# Patient Record
Sex: FEMALE | Race: WHITE | NOT HISPANIC OR LATINO | URBAN - METROPOLITAN AREA
[De-identification: names, ages, dates, MRNs, and addresses within clinical notes are randomized per-mention and may not be internally consistent; named-entity substitution may affect disease eponyms.]

---

## 2020-03-14 ENCOUNTER — EMERGENCY (EMERGENCY)
Facility: HOSPITAL | Age: 30
LOS: 1 days | Discharge: HOME | End: 2020-03-14
Attending: STUDENT IN AN ORGANIZED HEALTH CARE EDUCATION/TRAINING PROGRAM
Payer: COMMERCIAL

## 2020-03-14 ENCOUNTER — EMERGENCY (EMERGENCY)
Facility: HOSPITAL | Age: 30
LOS: 1 days | Discharge: HOME | End: 2020-03-14
Attending: INTERNAL MEDICINE | Admitting: INTERNAL MEDICINE
Payer: COMMERCIAL

## 2020-03-14 VITALS
TEMPERATURE: 100 F | RESPIRATION RATE: 18 BRPM | SYSTOLIC BLOOD PRESSURE: 114 MMHG | DIASTOLIC BLOOD PRESSURE: 70 MMHG | OXYGEN SATURATION: 100 % | HEART RATE: 92 BPM

## 2020-03-14 VITALS
DIASTOLIC BLOOD PRESSURE: 77 MMHG | HEART RATE: 93 BPM | TEMPERATURE: 99 F | OXYGEN SATURATION: 100 % | SYSTOLIC BLOOD PRESSURE: 112 MMHG | RESPIRATION RATE: 16 BRPM

## 2020-03-14 VITALS
WEIGHT: 115.08 LBS | TEMPERATURE: 99 F | SYSTOLIC BLOOD PRESSURE: 121 MMHG | HEIGHT: 67 IN | RESPIRATION RATE: 16 BRPM | DIASTOLIC BLOOD PRESSURE: 83 MMHG | OXYGEN SATURATION: 97 % | HEART RATE: 110 BPM

## 2020-03-14 VITALS
HEART RATE: 114 BPM | SYSTOLIC BLOOD PRESSURE: 116 MMHG | TEMPERATURE: 100 F | WEIGHT: 113.98 LBS | HEIGHT: 67 IN | DIASTOLIC BLOOD PRESSURE: 74 MMHG | RESPIRATION RATE: 18 BRPM

## 2020-03-14 DIAGNOSIS — K08.409 PARTIAL LOSS OF TEETH, UNSPECIFIED CAUSE, UNSPECIFIED CLASS: Chronic | ICD-10-CM

## 2020-03-14 LAB
ALBUMIN SERPL ELPH-MCNC: 3.8 G/DL — SIGNIFICANT CHANGE UP (ref 3.3–5)
ALBUMIN SERPL ELPH-MCNC: 4.4 G/DL — SIGNIFICANT CHANGE UP (ref 3.3–5)
ALP SERPL-CCNC: 56 U/L — SIGNIFICANT CHANGE UP (ref 40–120)
ALP SERPL-CCNC: 62 U/L — SIGNIFICANT CHANGE UP (ref 40–120)
ALT FLD-CCNC: 25 U/L — SIGNIFICANT CHANGE UP (ref 12–78)
ALT FLD-CCNC: 31 U/L — SIGNIFICANT CHANGE UP (ref 10–45)
ANION GAP SERPL CALC-SCNC: 10 MMOL/L — SIGNIFICANT CHANGE UP (ref 5–17)
ANION GAP SERPL CALC-SCNC: 5 MMOL/L — SIGNIFICANT CHANGE UP (ref 5–17)
APPEARANCE UR: CLEAR — SIGNIFICANT CHANGE UP
AST SERPL-CCNC: 20 U/L — SIGNIFICANT CHANGE UP (ref 15–37)
AST SERPL-CCNC: 30 U/L — SIGNIFICANT CHANGE UP (ref 10–40)
BACTERIA # UR AUTO: NEGATIVE — SIGNIFICANT CHANGE UP
BASOPHILS # BLD AUTO: 0.04 K/UL — SIGNIFICANT CHANGE UP (ref 0–0.2)
BASOPHILS NFR BLD AUTO: 0.6 % — SIGNIFICANT CHANGE UP (ref 0–2)
BILIRUB SERPL-MCNC: 0.6 MG/DL — SIGNIFICANT CHANGE UP (ref 0.2–1.2)
BILIRUB SERPL-MCNC: 0.8 MG/DL — SIGNIFICANT CHANGE UP (ref 0.2–1.2)
BILIRUB UR-MCNC: NEGATIVE — SIGNIFICANT CHANGE UP
BUN SERPL-MCNC: 16 MG/DL — SIGNIFICANT CHANGE UP (ref 7–23)
BUN SERPL-MCNC: 19 MG/DL — SIGNIFICANT CHANGE UP (ref 7–23)
CALCIUM SERPL-MCNC: 8.2 MG/DL — LOW (ref 8.5–10.1)
CALCIUM SERPL-MCNC: 9 MG/DL — SIGNIFICANT CHANGE UP (ref 8.4–10.5)
CHLORIDE SERPL-SCNC: 104 MMOL/L — SIGNIFICANT CHANGE UP (ref 96–108)
CHLORIDE SERPL-SCNC: 109 MMOL/L — HIGH (ref 96–108)
CO2 SERPL-SCNC: 25 MMOL/L — SIGNIFICANT CHANGE UP (ref 22–31)
CO2 SERPL-SCNC: 26 MMOL/L — SIGNIFICANT CHANGE UP (ref 22–31)
COLOR SPEC: SIGNIFICANT CHANGE UP
CREAT SERPL-MCNC: 0.76 MG/DL — SIGNIFICANT CHANGE UP (ref 0.5–1.3)
CREAT SERPL-MCNC: 0.81 MG/DL — SIGNIFICANT CHANGE UP (ref 0.5–1.3)
D DIMER BLD IA.RAPID-MCNC: 154 NG/ML DDU — SIGNIFICANT CHANGE UP
DIFF PNL FLD: NEGATIVE — SIGNIFICANT CHANGE UP
EOSINOPHIL # BLD AUTO: 0.03 K/UL — SIGNIFICANT CHANGE UP (ref 0–0.5)
EOSINOPHIL NFR BLD AUTO: 0.5 % — SIGNIFICANT CHANGE UP (ref 0–6)
EPI CELLS # UR: 1 /HPF — SIGNIFICANT CHANGE UP
GLUCOSE SERPL-MCNC: 100 MG/DL — HIGH (ref 70–99)
GLUCOSE SERPL-MCNC: 109 MG/DL — HIGH (ref 70–99)
GLUCOSE UR QL: NEGATIVE — SIGNIFICANT CHANGE UP
HCG SERPL-ACNC: <1 MIU/ML — SIGNIFICANT CHANGE UP
HCT VFR BLD CALC: 32.6 % — LOW (ref 34.5–45)
HCT VFR BLD CALC: 36.9 % — SIGNIFICANT CHANGE UP (ref 34.5–45)
HGB BLD-MCNC: 10.6 G/DL — LOW (ref 11.5–15.5)
HGB BLD-MCNC: 11.3 G/DL — LOW (ref 11.5–15.5)
HYALINE CASTS # UR AUTO: 0 /LPF — SIGNIFICANT CHANGE UP (ref 0–2)
IMM GRANULOCYTES NFR BLD AUTO: 0.3 % — SIGNIFICANT CHANGE UP (ref 0–1.5)
KETONES UR-MCNC: NEGATIVE — SIGNIFICANT CHANGE UP
LEUKOCYTE ESTERASE UR-ACNC: NEGATIVE — SIGNIFICANT CHANGE UP
LYMPHOCYTES # BLD AUTO: 0.6 K/UL — LOW (ref 1–3.3)
LYMPHOCYTES # BLD AUTO: 9.3 % — LOW (ref 13–44)
MAGNESIUM SERPL-MCNC: 2 MG/DL — SIGNIFICANT CHANGE UP (ref 1.6–2.6)
MCHC RBC-ENTMCNC: 26.9 PG — LOW (ref 27–34)
MCHC RBC-ENTMCNC: 28.2 PG — SIGNIFICANT CHANGE UP (ref 27–34)
MCHC RBC-ENTMCNC: 30.6 GM/DL — LOW (ref 32–36)
MCHC RBC-ENTMCNC: 32.5 GM/DL — SIGNIFICANT CHANGE UP (ref 32–36)
MCV RBC AUTO: 86.7 FL — SIGNIFICANT CHANGE UP (ref 80–100)
MCV RBC AUTO: 87.9 FL — SIGNIFICANT CHANGE UP (ref 80–100)
MONOCYTES # BLD AUTO: 0.41 K/UL — SIGNIFICANT CHANGE UP (ref 0–0.9)
MONOCYTES NFR BLD AUTO: 6.4 % — SIGNIFICANT CHANGE UP (ref 2–14)
NEUTROPHILS # BLD AUTO: 5.32 K/UL — SIGNIFICANT CHANGE UP (ref 1.8–7.4)
NEUTROPHILS NFR BLD AUTO: 82.9 % — HIGH (ref 43–77)
NITRITE UR-MCNC: NEGATIVE — SIGNIFICANT CHANGE UP
NRBC # BLD: 0 /100 WBCS — SIGNIFICANT CHANGE UP (ref 0–0)
NRBC # BLD: 0 /100 WBCS — SIGNIFICANT CHANGE UP (ref 0–0)
PH UR: 6.5 — SIGNIFICANT CHANGE UP (ref 5–8)
PHOSPHATE SERPL-MCNC: 3.1 MG/DL — SIGNIFICANT CHANGE UP (ref 2.5–4.5)
PLATELET # BLD AUTO: 166 K/UL — SIGNIFICANT CHANGE UP (ref 150–400)
PLATELET # BLD AUTO: 169 K/UL — SIGNIFICANT CHANGE UP (ref 150–400)
POTASSIUM SERPL-MCNC: 3.9 MMOL/L — SIGNIFICANT CHANGE UP (ref 3.5–5.3)
POTASSIUM SERPL-MCNC: 4.1 MMOL/L — SIGNIFICANT CHANGE UP (ref 3.5–5.3)
POTASSIUM SERPL-SCNC: 3.9 MMOL/L — SIGNIFICANT CHANGE UP (ref 3.5–5.3)
POTASSIUM SERPL-SCNC: 4.1 MMOL/L — SIGNIFICANT CHANGE UP (ref 3.5–5.3)
PROT SERPL-MCNC: 6.5 G/DL — SIGNIFICANT CHANGE UP (ref 6–8.3)
PROT SERPL-MCNC: 7 G/DL — SIGNIFICANT CHANGE UP (ref 6–8.3)
PROT UR-MCNC: NEGATIVE — SIGNIFICANT CHANGE UP
RAPID RVP RESULT: SIGNIFICANT CHANGE UP
RBC # BLD: 3.76 M/UL — LOW (ref 3.8–5.2)
RBC # BLD: 4.2 M/UL — SIGNIFICANT CHANGE UP (ref 3.8–5.2)
RBC # FLD: 14.5 % — SIGNIFICANT CHANGE UP (ref 10.3–14.5)
RBC # FLD: 14.7 % — HIGH (ref 10.3–14.5)
RBC CASTS # UR COMP ASSIST: 1 /HPF — SIGNIFICANT CHANGE UP (ref 0–4)
SODIUM SERPL-SCNC: 139 MMOL/L — SIGNIFICANT CHANGE UP (ref 135–145)
SODIUM SERPL-SCNC: 140 MMOL/L — SIGNIFICANT CHANGE UP (ref 135–145)
SP GR SPEC: 1.01 — SIGNIFICANT CHANGE UP (ref 1.01–1.02)
UROBILINOGEN FLD QL: NEGATIVE — SIGNIFICANT CHANGE UP
WBC # BLD: 6.42 K/UL — SIGNIFICANT CHANGE UP (ref 3.8–10.5)
WBC # BLD: 6.91 K/UL — SIGNIFICANT CHANGE UP (ref 3.8–10.5)
WBC # FLD AUTO: 6.42 K/UL — SIGNIFICANT CHANGE UP (ref 3.8–10.5)
WBC # FLD AUTO: 6.91 K/UL — SIGNIFICANT CHANGE UP (ref 3.8–10.5)
WBC UR QL: 0 /HPF — SIGNIFICANT CHANGE UP (ref 0–5)

## 2020-03-14 PROCEDURE — 83735 ASSAY OF MAGNESIUM: CPT

## 2020-03-14 PROCEDURE — 81001 URINALYSIS AUTO W/SCOPE: CPT

## 2020-03-14 PROCEDURE — 99284 EMERGENCY DEPT VISIT MOD MDM: CPT

## 2020-03-14 PROCEDURE — 87486 CHLMYD PNEUM DNA AMP PROBE: CPT

## 2020-03-14 PROCEDURE — 85027 COMPLETE CBC AUTOMATED: CPT

## 2020-03-14 PROCEDURE — 36415 COLL VENOUS BLD VENIPUNCTURE: CPT

## 2020-03-14 PROCEDURE — 84100 ASSAY OF PHOSPHORUS: CPT

## 2020-03-14 PROCEDURE — 87581 M.PNEUMON DNA AMP PROBE: CPT

## 2020-03-14 PROCEDURE — 96374 THER/PROPH/DIAG INJ IV PUSH: CPT

## 2020-03-14 PROCEDURE — 93010 ELECTROCARDIOGRAM REPORT: CPT

## 2020-03-14 PROCEDURE — 93005 ELECTROCARDIOGRAM TRACING: CPT

## 2020-03-14 PROCEDURE — 87798 DETECT AGENT NOS DNA AMP: CPT

## 2020-03-14 PROCEDURE — 80053 COMPREHEN METABOLIC PANEL: CPT

## 2020-03-14 PROCEDURE — 87633 RESP VIRUS 12-25 TARGETS: CPT

## 2020-03-14 PROCEDURE — 99284 EMERGENCY DEPT VISIT MOD MDM: CPT | Mod: 25

## 2020-03-14 PROCEDURE — 85379 FIBRIN DEGRADATION QUANT: CPT

## 2020-03-14 PROCEDURE — 84702 CHORIONIC GONADOTROPIN TEST: CPT

## 2020-03-14 PROCEDURE — 99285 EMERGENCY DEPT VISIT HI MDM: CPT

## 2020-03-14 RX ORDER — PANTOPRAZOLE SODIUM 20 MG/1
40 TABLET, DELAYED RELEASE ORAL ONCE
Refills: 0 | Status: COMPLETED | OUTPATIENT
Start: 2020-03-14 | End: 2020-03-14

## 2020-03-14 RX ORDER — ACETAMINOPHEN 500 MG
650 TABLET ORAL EVERY 6 HOURS
Refills: 0 | Status: DISCONTINUED | OUTPATIENT
Start: 2020-03-14 | End: 2020-03-18

## 2020-03-14 RX ORDER — DEXLANSOPRAZOLE 30 MG/1
1 CAPSULE, DELAYED RELEASE ORAL
Qty: 0 | Refills: 0 | DISCHARGE

## 2020-03-14 RX ADMIN — Medication 650 MILLIGRAM(S): at 13:34

## 2020-03-14 RX ADMIN — Medication 650 MILLIGRAM(S): at 14:04

## 2020-03-14 RX ADMIN — PANTOPRAZOLE SODIUM 40 MILLIGRAM(S): 20 TABLET, DELAYED RELEASE ORAL at 01:46

## 2020-03-14 NOTE — ED PROVIDER NOTE - PATIENT PORTAL LINK FT
You can access the FollowMyHealth Patient Portal offered by [facility] by registering at the following website: [email or web address]. By joining [facility]’s FollowMyHealth portal, you will also be able to view your health information using other applications (apps) compatible with our system.

## 2020-03-14 NOTE — ED ADULT NURSE NOTE - OBJECTIVE STATEMENT
Patient with history of hyperthyroidism (no meds) and acid reflux (dexilant) presents to the unit with steady gait with c/o intermittent epigastric area "pressure-like" since this morning.  Nothing makes it worse, took Gaviscon to no relief.  Patient also c/o frequent "migraine" x 3 months described as sharp pain above R eye brow, advised by PCP to see a neurologist but patient has not seen one yet.  Also endorses fever, 100.3 today; took motrin around 1900 last night.  Does Patient with history of hyperthyroidism (no meds) and acid reflux (dexilant) presents to the unit with steady gait with c/o intermittent epigastric area "pressure-like" since this morning.  Nothing makes it worse, took Gaviscon to no relief.  Patient also c/o frequent "migraine" x 3 months described as sharp pain above R eye brow, advised by PCP to see a neurologist but patient has not seen one yet.  Also endorses fever, 100.3 today; took motrin around 1900 last night.  Denies SOB, lightheadedness, dizziness, numbness, tingling, sick contacts, recent travel.  Does not take birth control, is not a smoker or drinker.

## 2020-03-14 NOTE — ED ADULT NURSE NOTE - OBJECTIVE STATEMENT
Patient is a 30 yo woman with hx: hyperthyroidism and  GERD who presented to the ER with chest pain and abdominal pain with nausea. Pt was seen in [facility] for the same problem. No distress. Breathing easy and non labored. Pt ambulatory. Pt very anxious. Emotional support offered.

## 2020-03-14 NOTE — ED PROVIDER NOTE - PHYSICAL EXAMINATION
PHYSICAL EXAM:  Vital Signs Last 24 Hrs  T(C): 37.6 (14 Mar 2020 09:27), Max: 37.6 (14 Mar 2020 09:27)  T(F): 99.6 (14 Mar 2020 09:27), Max: 99.6 (14 Mar 2020 09:27)  HR: 114 (14 Mar 2020 09:27) (114 - 114)  BP: 116/74 (14 Mar 2020 09:27) (116/74 - 116/74)  RR: 18 (14 Mar 2020 09:27) (18 - 18)    CONSTITUTIONAL: NAD, thin   EYES: PERRLA; conjunctiva and sclera clear  ENMT: Moist oral mucosa  NECK: Supple  RESPIRATORY: Normal respiratory effort; lungs are clear to auscultation bilaterally  CARDIOVASCULAR: Regular rate and rhythm, normal S1 and S2, no murmur/rub/gallop; No lower extremity edema; Peripheral pulses are 2+ bilaterally  ABDOMEN: Nontender to palpation, normoactive bowel sounds  MUSCULOSKELETAL:  Normal gait  PSYCH: A+O to person, place, and time; affect appropriate  NEUROLOGY: no gross sensory deficits   SKIN: No rashes; no palpable lesions

## 2020-03-14 NOTE — ED POST DISCHARGE NOTE - DETAILS
Pt called asking about results, informed of results and told that out of precaution would recommend self quarantine x 14 days, avoid exposure to large groups and especially to very young/elderly/immunocompromised. Reutnr to ED if SOB or any other concerns. Call GI to discuss policy on office visits for gastritis. -Dallin Sanchez PA-C

## 2020-03-14 NOTE — ED PROVIDER NOTE - PATIENT PORTAL LINK FT
You can access the FollowMyHealth Patient Portal offered by [facility] by registering at the following website: [email or web address]. By joining [facility]’s FollowMyHealth portal, you will also be able to view your health information using other applications (apps) compatible with our system.

## 2020-03-14 NOTE — ED PROVIDER NOTE - CLINICAL SUMMARY MEDICAL DECISION MAKING FREE TEXT BOX
acute chest wall pain that is reproducible and H/O GERD on PPI, the symptoms have subsided  Will order EKG , labs and D-Dimer, will refer to out patient cardiology

## 2020-03-14 NOTE — ED PROVIDER NOTE - CLINICAL SUMMARY MEDICAL DECISION MAKING FREE TEXT BOX
Hien Palm MD 29 F w/ hx of hyperthyroidism, p/w generalized weakness, and "feeling unwell" pt denies any nausea no vomiting. No lightheadedness, no fevers, no chills. Pt was seen at [facility] less than 12 hours ago, was dx w/ gerd, went home but now is having worsening symptoms. Pt states she ate breakfast this AM, but now is requesting hospital admission. She denies any vomiting, no weightloss, feels safe at home, pt reports that she has no SI/HI/anxiety/depression. pt is nontoxic appearing, is ambulatory to the department, lungs clear R flank pain, no suprapubic abdominal pain. plan for repeat electrolyte testing, symptomatic treatment w/ ppi and likely dc home. Pt is currently on menstraul period, ekg sinus tach Hien Palm MD 29 F w/ hx of hyperthyroidism, p/w generalized weakness, and "feeling unwell" pt denies any nausea no vomiting. No lightheadedness, no fevers, no chills. Pt was seen at [facility] less than 12 hours ago, was dx w/ gerd, went home but now is having worsening symptoms. Pt states she ate breakfast this AM, but now is requesting hospital admission. She denies any vomiting, no weightloss, feels safe at home, pt reports that she has no SI/HI/anxiety/depression. pt is nontoxic appearing, is ambulatory to the department, lungs clear R flank pain, no suprapubic abdominal pain. plan for repeat electrolyte testing, symptomatic treatment w/ ppi and likely dc home. Pt is currently on menstraul period, ekg sinus tach, HR was improved.

## 2020-03-14 NOTE — ED PROVIDER NOTE - SIGNIFICANT NEGATIVE FINDINGS
no headache, no syncope,  no SOB, no palpitations, no abdominal pain , no urinary symptoms,  no neuro changes.

## 2020-03-14 NOTE — ED ADULT NURSE NOTE - CHPI ED NUR SYMPTOMS NEG
no dizziness/no diaphoresis/no congestion/no back pain/no shortness of breath/no fever/no nausea/no syncope/no vomiting/no chills

## 2020-03-14 NOTE — ED PROVIDER NOTE - OBJECTIVE STATEMENT
28 y/o female  C/C chest pain, nausea, increased with palpation and inspiration. The pain was gripping before she arrived but she is more relaxed. She has GERD symptoms and takes A PPI, also experiencing  migraine since 0900 yesterday.  no fever no chills, no rash, no toxemia no SOB no sick contacts, no travel

## 2020-03-14 NOTE — ED PROVIDER NOTE - PROGRESS NOTE DETAILS
BA PGY-3 Patient with epigastric pain, concerning for gastritis/reflux 2/2 to NSAID use. Improved with maalox.  Lab wnl. Low grade fever, s/p rvp and acetaminophen. Dispo home, with follow up with PCP. BA PGY-3 Patient with epigastric pain, concerning for gastritis/reflux 2/2 to NSAID use. Lab wnl. Low grade fever, s/p rvp and acetaminophen. Dispo home, with follow up with PCP.

## 2020-03-14 NOTE — ED PROVIDER NOTE - NS ED ROS FT
Constitutional:  Fatigue, Weight loss (-7 pounds)  Eyes: No recent vision problems or eye pain.  ENT: No congestion, ear pain, or sore throat.  Endocrine: No excess sweating, temperature intolerance  Cardiovascular: No chest pain, palpitations, shortness of breath, pre-syncope, syncope  Respiratory: No cough, congestion, or wheezing.  Gastrointestinal: No abdominal pain, nausea, vomiting, or diarrhea.  Genitourinary: No dysuria, hematuria  Musculoskeletal: No joint swelling, joint pain  Neurologic: No headache, dizziness, focal weakness  Skin: No rashes, hematoma, prupura Constitutional:  Fatigue, Weight loss (-7 pounds)  Eyes: No recent vision problems or eye pain.  ENT: No congestion, ear pain, or sore throat.  Endocrine: No excess sweating, temperature intolerance  Cardiovascular: No chest pain, palpitations, shortness of breath  Respiratory: No cough, congestion, or wheezing.  Gastrointestinal: epigastric pain ,nausea. No vomiting, or diarrhea.  Genitourinary: No dysuria, hematuria  Musculoskeletal: No joint swelling, joint pain  Neurologic: headache. No dizziness, focal weakness  Skin: No rashes, hematoma, prupura

## 2020-03-14 NOTE — ED PROVIDER NOTE - CARE PROVIDER_API CALL
Oli Garnica)  Cardiovascular Disease; Internal Medicine  [address] [place] [address]  Phone: [phone]  Fax: [phone]  Follow Up Time: 1-3 Days

## 2020-03-14 NOTE — ED ADULT NURSE REASSESSMENT NOTE - NS ED NURSE REASSESS COMMENT FT1
Remote control given to patient, call bell is within reach.  Stretcher in the lowest position with side rails up and safety maintained.  Questions and concerns answered at bedside with JOSÉ MIGUEL Toscano.  Lab results pending.

## 2020-03-16 PROBLEM — K21.9 GASTRO-ESOPHAGEAL REFLUX DISEASE WITHOUT ESOPHAGITIS: Chronic | Status: ACTIVE | Noted: 2020-03-14

## 2020-03-16 PROBLEM — E05.90 THYROTOXICOSIS, UNSPECIFIED WITHOUT THYROTOXIC CRISIS OR STORM: Chronic | Status: ACTIVE | Noted: 2020-03-14

## 2021-08-16 NOTE — ED ADULT TRIAGE NOTE - CCCP TRG CHIEF CMPLNT
[facility] Transitions Follow Up Call    2021    Patient: Edmund Crespo  Patient : 1947   MRN: [id number]  Reason for Admission:   Discharge Date: 8/15/21 RARS: Readmission Risk Score: 30    Patient was discharged to [facility] on 8/15/21. CTN send email to [facility] to verify whether or not they will be following patient for BPCI-A bundle. Received email from [employer] with [facility]. She confirmed [facility] will be following this patient.     CTN removing self from care team.        Byrd Sandifer, RN abdominal pain/chest pain

## 2023-01-01 NOTE — ED PROVIDER NOTE - OBJECTIVE STATEMENT
28 yo woman with pmhx hyperthyroidism (off meds), GERD (on dexilant), chronic headaches who presents for epigastric pain. Patient endorses she felt chills at home around 11pm, checked at temp was 100.3 F and went to [facility] , were she was diagnosed with reflux and sent home. Yesterday at work she developed chest pain and abdominal pain, burning like, 8/10, non radiating which she attributed to reflux. She endorses nausea, no vomiting. Tolerated breakfast. Of note she is in her period has been taking Motrin for the past 3 days. She denied dysuria, hematuria, cough, sore throat, diarrhea, constipation. 28 yo woman with pmhx hyperthyroidism (off meds), GERD (on dexilant), chronic headaches who presents for epigastric pain. Patient endorses she felt chills at home around 11pm, checked at temp was 100.3 F and went to [facility] , were she was diagnosed with reflux and sent home. Yesterday at work she developed chest pain and abdominal pain, burning like, 8/10, non radiating which she attributed to reflux. She endorses nausea, no vomiting. Tolerated breakfast. Of note she is in her period has been taking Motrin for the past 3 days. She denied dysuria, hematuria, cough, sore throat, diarrhea, constipation, dark tarry stools. supervision Stephanie Garcia  (DO)  2023 00:55:31

## 2023-08-14 NOTE — ED ADULT TRIAGE NOTE - PATIENT ON (OXYGEN DELIVERY METHOD)
room air Eucrisa Counseling: Patient may experience a mild burning sensation during topical application. Eucrisa is not approved in children less than 2 years of age.

## 2023-11-07 NOTE — ED ADULT NURSE NOTE - IS THE PATIENT ABLE TO BE SCREENED?
Yes
Patient tentatively scheduled for right thyroid lobectomy possible total thyroidectomy possible paratracheal node dissection for 11/20/23. Pre-op instructions provided. Pt given verbal and written instructions with teach back on chlorhexidine shampoo and pepcid. Pt verbalized understanding with return demonstration.     CBC CMP HCG done

## 2024-06-04 PROBLEM — K21.9 GASTRO-ESOPHAGEAL REFLUX DISEASE WITHOUT ESOPHAGITIS: Chronic | Status: ACTIVE | Noted: 2020-03-14

## 2024-07-02 ENCOUNTER — APPOINTMENT (OUTPATIENT)
Dept: OBGYN | Facility: CLINIC | Age: 34
End: 2024-07-02

## 2024-07-02 PROCEDURE — 96127 BRIEF EMOTIONAL/BEHAV ASSMT: CPT

## 2024-07-02 PROCEDURE — 99385 PREV VISIT NEW AGE 18-39: CPT

## 2025-07-09 ENCOUNTER — APPOINTMENT (OUTPATIENT)
Dept: OBGYN | Facility: CLINIC | Age: 35
End: 2025-07-09
Payer: COMMERCIAL

## 2025-07-09 PROCEDURE — 96127 BRIEF EMOTIONAL/BEHAV ASSMT: CPT

## 2025-07-09 PROCEDURE — 99459 PELVIC EXAMINATION: CPT

## 2025-07-09 PROCEDURE — 99395 PREV VISIT EST AGE 18-39: CPT
